# Patient Record
Sex: MALE | Race: WHITE | NOT HISPANIC OR LATINO | URBAN - METROPOLITAN AREA
[De-identification: names, ages, dates, MRNs, and addresses within clinical notes are randomized per-mention and may not be internally consistent; named-entity substitution may affect disease eponyms.]

---

## 2019-01-11 ENCOUNTER — HISTORICAL (OUTPATIENT)
Dept: ADMINISTRATIVE | Facility: HOSPITAL | Age: 54
End: 2019-01-11

## 2019-01-11 LAB
ALBUMIN SERPL-MCNC: 4.6 GM/DL (ref 3.4–5)
ALBUMIN/GLOB SERPL: 1.84 {RATIO} (ref 1.5–2.5)
ALP SERPL-CCNC: 23 UNIT/L (ref 38–126)
ALT SERPL-CCNC: 33 UNIT/L (ref 7–52)
AST SERPL-CCNC: 26 UNIT/L (ref 15–37)
BILIRUB SERPL-MCNC: 0.5 MG/DL (ref 0.2–1)
BILIRUBIN DIRECT+TOT PNL SERPL-MCNC: 0.1 MG/DL (ref 0–0.5)
BILIRUBIN DIRECT+TOT PNL SERPL-MCNC: 0.4 MG/DL
BUN SERPL-MCNC: 24 MG/DL (ref 7–18)
CALCIUM SERPL-MCNC: 8.9 MG/DL (ref 8.5–10)
CHLORIDE SERPL-SCNC: 102 MMOL/L (ref 98–107)
CHOLEST SERPL-MCNC: 169 MG/DL (ref 0–200)
CHOLEST/HDLC SERPL: 6.3 {RATIO}
CO2 SERPL-SCNC: 28 MMOL/L (ref 21–32)
CREAT SERPL-MCNC: 1.07 MG/DL (ref 0.6–1.3)
EST. AVERAGE GLUCOSE BLD GHB EST-MCNC: 171 MG/DL
GLOBULIN SER-MCNC: 2.5 GM/DL (ref 1.2–3)
GLUCOSE SERPL-MCNC: 128 MG/DL (ref 74–106)
HBA1C MFR BLD: 7.6 % (ref 4.4–6.4)
HDLC SERPL-MCNC: 27 MG/DL (ref 35–60)
LDLC SERPL CALC-MCNC: 126 MG/DL (ref 0–129)
POTASSIUM SERPL-SCNC: 5.1 MMOL/L (ref 3.5–5.1)
PROT SERPL-MCNC: 7.1 GM/DL (ref 6.4–8.2)
SODIUM SERPL-SCNC: 136 MMOL/L (ref 136–145)
TRIGL SERPL-MCNC: 159 MG/DL (ref 30–150)
VLDLC SERPL CALC-MCNC: 31.8 MG/DL

## 2022-04-07 ENCOUNTER — HISTORICAL (OUTPATIENT)
Dept: ADMINISTRATIVE | Facility: HOSPITAL | Age: 57
End: 2022-04-07

## 2022-04-23 VITALS
DIASTOLIC BLOOD PRESSURE: 70 MMHG | BODY MASS INDEX: 31.53 KG/M2 | OXYGEN SATURATION: 100 % | SYSTOLIC BLOOD PRESSURE: 92 MMHG | WEIGHT: 220.25 LBS | HEIGHT: 70 IN

## 2022-05-01 NOTE — HISTORICAL [facility] CERNER
This is a historical note converted from [place]. Formatting and pictures may have been removed.  Please reference Candida for original formatting and attached multimedia. Chief Complaint  6 month recheck meds  History of Present Illness  father  in november of old age at 85, had CAD/ valve issues  sugars have been irregular,? wife got laid off, more stress,  sugars have been high  they want to move back to [place]  started keto diet so fasting CBGs have been much better  going to [employer]  Review of Systems  Comprehensive Review of Systems performed with no exceptions for new complaints other than as noted in HPI.  Physical Exam  Vitals & Measurements  HR:?88(Peripheral)? BP:?120/70?  HT:?177.5?cm? HT:?177.5?cm? WT:?102.4?kg? WT:?102.4?kg? BMI:?32.5?  ?  PHYSICAL EXAM  ?   WDWN patient in NAD, ?AFVSS  HEENT - no acute abnormality  ??????????????? oropharynx WNL  HEART - RRR  LUNGS -? CTA  EXTREMITIES - No CCE  SKIN - warm, dry, intact  PSYCH - affect appropriate;? alert and oriented  NEURO- no new deficits noted;? cranial nerves grossly intact  Has a recurrence of an inclusion cyst that was previously removed in his left lower quadrant of the abdomen  Assessment/Plan  1.?DM (diabetes mellitus), type 2?E11.9  ?A1c was 7.0?a year ago, 7.2 the summer.? He has not really been working on his diet and exercise?as much as he could  2.?HLD (hyperlipidemia)?E78.5  ?Total?cholesterol was good a year ago  4.?Neuropathic pain?M79.2  ?Chronic stable  5.?EIC (epidermal inclusion cyst)?L72.0  ?left lower abdomen rtc for procedure time  He may be moving to [place]?so?he may?not be coming back for his?wellness in 6 months.? Go ahead and schedule?the physical?but?if he?does move to [place] then he will cancel it   Problem List/Past Medical History  Ongoing  Chronic back pain  DM (diabetes mellitus), type 2  ED (erectile dysfunction)  Edema  EIC (epidermal inclusion cyst)  History of DVT in adulthood  HLD (hyperlipidemia)  Low  testosterone  Neuropathic pain  Psoriatic arthritis  Tobacco user  Wellness examination  Historical  No qualifying data  Procedure/Surgical History  spinal fusion (2008)   Medications  CYCLOBENZAPR TAB 10MG, 10 mg= 1 tab(s), Oral, TID  DULOXETINE CAP 30MG  Elidel 1% topical cream, 1 trace, TOP, BID, 5 refills  free style lite test strips, See Instructions, 3 refills  lisinopril 10 mg oral tablet, 10 mg= 1 tab(s), Oral, Daily, 1 refills  lovastatin 40 mg oral tablet, 40 mg= 1 tab(s), Oral, Daily, 1 refills  metformin 500 mg oral tablet, See Instructions  NAPROXEN TAB 500MG  OXYCOD/APAP TAB 10-325MG  sildenafil 20 mg oral tablet, 100 mg= 5 tab(s), Oral, Daily, 10 refills,? ?Not Taking, Completed Rx  VIAGRA TAB 100MG  Allergies  No Known Medication Allergies  Social History  Alcohol  Employment/School  Employed, Work/School description: [employer] 07/10/2018  Home/Environment  Lives with Children, Spouse. Living situation: Home/Independent., 07/10/2018  Substance Abuse  Never, 07/10/2018  Tobacco  Current some day smoker Use:. Cigars Type:., 07/10/2018  Family History  CAD - Coronary artery disease: Father.  Diabetes mellitus type 2: Mother and Father.  Osteoarthritis: Mother and Father.  Immunizations  Vaccine Date Status   pneumococcal 23-polyvalent vaccine 06/08/2016 Recorded   tetanus/diphth/pertuss (Tdap) adult/adol 03/09/2015 Recorded   Health Maintenance  Health Maintenance  ???Pending?(in the next year)  ??? ??Due?  ??? ? ? ?ADL Screening due??01/14/19??and every 1??year(s)  ??? ? ? ?Alcohol Misuse Screening due??01/14/19??and every 1??year(s)  ??? ? ? ?Aspirin Therapy for CVD Prevention due??01/14/19??and every 1??year(s)  ??? ? ? ?Depression Screening due??01/14/19??and every?  ??? ? ? ?Diabetes Maintenance-Eye Exam due??01/14/19??and every?  ??? ? ? ?Diabetes Maintenance-Foot Exam due??01/14/19??and every?  ??? ? ? ?Diabetes Maintenance-Urine Dipstick due??01/14/19??Variable frequency  ??? ? ? ?Smoking Cessation  due??01/14/19??Variable frequency  ??? ? ? ?Smoking Cessation (Diabetes) due??01/14/19??and every?  ??? ??Due In Future?  ??? ? ? ?Diabetes Maintenance-Microalbumin not due until??07/12/19??and every 1??year(s)  ??? ? ? ?Blood Pressure Screening not due until??01/11/20??and every 1??year(s)  ??? ? ? ?Body Mass Index Check not due until??01/11/20??and every 1??year(s)  ??? ? ? ?Diabetes Maintenance-HgbA1c not due until??01/11/20??and every 1??year(s)  ??? ? ? ?Diabetes Maintenance-Fasting Lipid Profile not due until??01/11/20??and every 1??year(s)  ??? ? ? ?Obesity Screening not due until??01/11/20??and every 1??year(s)  ??? ? ? ?Diabetes Maintenance-Serum Creatinine not due until??01/11/20??and every 1??year(s)  ???Satisfied?(in the past 1 year)  ??? ??Satisfied?  ??? ? ? ?Blood Pressure Screening on??01/11/19.??Satisfied by Dayanara Lees LPN  ??? ? ? ?Body Mass Index Check on??01/11/19.??Satisfied by Dayanara Lees LPN  ??? ? ? ?Diabetes Maintenance-Serum Creatinine on??01/11/19.??Satisfied by Barbara Carpenter  ??? ? ? ?Diabetes Screening on??01/11/19.??Satisfied by Barbara Carpenter  ??? ? ? ?Lipid Screening on??01/11/19.??Satisfied by Barbara Carpenter  ??? ? ? ?Obesity Screening on??01/11/19.??Satisfied by Dayanara Lees LPN  ?  ?

## 2022-07-06 PROBLEM — I10 HYPERTENSION: Status: ACTIVE | Noted: 2022-07-06

## 2022-07-06 PROBLEM — L40.50 PSORIATIC ARTHRITIS: Status: ACTIVE | Noted: 2022-07-06

## 2022-07-06 PROBLEM — E11.9 TYPE 2 DIABETES MELLITUS: Status: ACTIVE | Noted: 2022-07-06

## 2022-07-06 PROBLEM — Z00.00 ENCOUNTER FOR WELLNESS EXAMINATION: Status: ACTIVE | Noted: 2022-07-06

## 2022-07-06 PROBLEM — M54.9 CHRONIC BACK PAIN: Status: ACTIVE | Noted: 2022-07-06

## 2022-07-06 PROBLEM — E78.5 HYPERLIPIDEMIA: Status: ACTIVE | Noted: 2022-07-06

## 2022-07-06 PROBLEM — M79.2 NEUROPATHIC PAIN: Status: ACTIVE | Noted: 2022-07-06

## 2022-07-06 PROBLEM — N52.9 ED (ERECTILE DYSFUNCTION): Status: ACTIVE | Noted: 2022-07-06

## 2022-07-06 PROBLEM — R60.9 EDEMA: Status: ACTIVE | Noted: 2022-07-06

## 2022-07-06 PROBLEM — G89.29 CHRONIC BACK PAIN: Status: ACTIVE | Noted: 2022-07-06

## 2022-07-06 PROBLEM — Z86.718 HISTORY OF DEEP VENOUS THROMBOSIS: Status: ACTIVE | Noted: 2022-07-06

## 2022-07-06 PROBLEM — R79.89 DECREASED TESTOSTERONE LEVEL: Status: ACTIVE | Noted: 2022-07-06

## 2022-07-07 PROBLEM — M75.21 BICEPS TENDINITIS ON RIGHT: Status: ACTIVE | Noted: 2022-07-07

## 2022-10-06 PROBLEM — Z72.0 TOBACCO USER: Status: ACTIVE | Noted: 2022-10-06

## 2022-10-06 PROCEDURE — 86803 HEPATITIS C AB TEST: CPT | Performed by: FAMILY MEDICINE

## 2022-10-10 PROBLEM — Z00.00 ENCOUNTER FOR WELLNESS EXAMINATION: Status: RESOLVED | Noted: 2022-07-06 | Resolved: 2022-10-10

## 2023-01-06 PROCEDURE — 87389 HIV-1 AG W/HIV-1&-2 AB AG IA: CPT | Performed by: FAMILY MEDICINE
